# Patient Record
Sex: MALE | URBAN - METROPOLITAN AREA
[De-identification: names, ages, dates, MRNs, and addresses within clinical notes are randomized per-mention and may not be internally consistent; named-entity substitution may affect disease eponyms.]

---

## 2024-07-26 ENCOUNTER — TELEPHONE (OUTPATIENT)
Dept: NEUROSURGERY | Facility: CLINIC | Age: 74
End: 2024-07-26
Payer: OTHER GOVERNMENT

## 2024-07-26 NOTE — TELEPHONE ENCOUNTER
07/26/24 PT CALLED TO INFORM THAT HIS MRI LUMBAR WAS COMPLETED AT [facility] AND [facility]. [facility] SAID THAT [facility].  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 HE HAS BEEN RECEIVING MANY CALLS AND WILL NOT ANSWER ANYMORE CALLS TODAY.    PT PHONE NUMBER: [phone]  (HE SAID HE WONT ANSWER ANY MORE CALLS.  I REMINDED PT OF HIS UPCOMING APPOINTMENT WITH DR. HOUSE AT 08/15/24 AT 9:45 A.M.

## 2024-08-01 ENCOUNTER — TELEPHONE (OUTPATIENT)
Dept: NEUROSURGERY | Facility: CLINIC | Age: 74
End: 2024-08-01

## 2024-08-21 NOTE — PROGRESS NOTES
Patient ID: Jed Valladares is a 73 y.o. male is being seen for consultation today at the request of Jared Schaffer DO for low back pain.     Imaging: MRI of the lumbar spine performed on 07/06/2024    Subjective     The patient is here in regards to   Chief Complaint   Patient presents with    Back Pain       History of Present Illness  Mr. Valladares has had longstanding back pain ever since his tour in [place] where he was injured.  He describes pain in his low back as well as bilateral burning sensation in his legs.  He is unable to ambulate very well and uses a walker and a cane consistently.  He has not had an epidural steroid injection in his low back or SI joint injections.  [facility]  He denies any recent trauma or falls noticed progressive worsening of his balance over the past 1 year with progressive worsening of back pain as well.      While in the room and during my examination of the patient I wore a mask and eye protection.  I washed my hands before and after this patient encounter.  The patient was also wearing a mask.    The following portions of the patient's history were reviewed and updated as appropriate: allergies, current medications, past family history, past medical history, past social history, past surgical history and problem list.    Review of Systems   Constitutional:  Negative for fever.   HENT:  Positive for congestion and tinnitus.    Eyes:  Negative for visual disturbance (IMPLANT IN LEFT EYE).   Respiratory:  Positive for shortness of breath. Negative for chest tightness.    Cardiovascular:  Negative for chest pain.   Gastrointestinal:  Negative for nausea and vomiting.   Endocrine: Positive for heat intolerance. Negative for cold intolerance.   Genitourinary:  Positive for difficulty urinating.   Musculoskeletal:  Positive for back pain and gait problem.   Skin:  Negative for rash.   Allergic/Immunologic: Positive for environmental  allergies.   Neurological:  Positive for numbness and headaches. Negative for dizziness, weakness and light-headedness.   Hematological:  Bruises/bleeds easily.   Psychiatric/Behavioral:  Negative for confusion and decreased concentration.         History reviewed. No pertinent past medical history.    Allergies   Allergen Reactions    Aspirin Hives, Itching and Other (See Comments)     RAISES HIS BLOOD PRESSURE    Egg-Derived Products Hives, Itching and Irritability       History reviewed. No pertinent family history.    Social History     Socioeconomic History    Marital status: Unknown   Tobacco Use    Smoking status: Every Day     Current packs/day: 0.25     Average packs/day: 0.3 packs/day for 65.6 years (16.4 ttl pk-yrs)     Types: Cigarettes     Start date: 1959    Smokeless tobacco: Never   Vaping Use    Vaping status: Never Used       History reviewed. No pertinent surgical history.      Objective     Vitals:    08/22/24 1046   BP: 130/72   Pulse: 54   Resp: 16   SpO2: 96%     Body mass index is 31.58 kg/m².    Physical Exam  Constitutional:       Appearance: Normal appearance.   HENT:      Head: Normocephalic and atraumatic.   Eyes:      Extraocular Movements: Extraocular movements intact.      Conjunctiva/sclera: Conjunctivae normal.      Pupils: Pupils are equal, round, and reactive to light.   Cardiovascular:      Rate and Rhythm: Normal rate and regular rhythm.      Pulses: Normal pulses.   Pulmonary:      Breath sounds: Normal breath sounds.   Abdominal:      Palpations: Abdomen is soft.   Musculoskeletal:         General: Normal range of motion.      Cervical back: Normal range of motion and neck supple.      Comments: Bilateral SI joint inflammation characterized by:    -Pelvic Distraction test   -Lateral Iliac compression test   -FABERS (Kin's test)   -Ganslen's Test     Skin:     General: Skin is warm and dry.   Neurological:      Mental Status: He is alert and oriented to person, place, and  time.      Cranial Nerves: Cranial nerves 2-12 are intact.      Motor: Motor function is intact. No weakness or atrophy.      Coordination: Coordination is intact. Romberg sign negative. Romberg Test normal.      Gait: Gait is intact. Gait normal.      Deep Tendon Reflexes: Reflexes are normal and symmetric.      Reflex Scores:       Tricep reflexes are 2+ on the right side and 2+ on the left side.       Bicep reflexes are 2+ on the right side and 2+ on the left side.       Brachioradialis reflexes are 2+ on the right side and 2+ on the left side.       Patellar reflexes are 2+ on the right side and 2+ on the left side.       Achilles reflexes are 2+ on the right side and 2+ on the left side.  Psychiatric:         Speech: Speech normal.         Neurologic Exam     Mental Status   Oriented to person, place, and time.   Attention: normal. Concentration: normal.   Speech: speech is normal   Level of consciousness: alert    Cranial Nerves   Cranial nerves II through XII intact.     CN III, IV, VI   Pupils are equal, round, and reactive to light.    Motor Exam   Muscle bulk: normal  Overall muscle tone: normal    Strength   Strength 5/5 except as noted.     Sensory Exam   Light touch normal.     Gait, Coordination, and Reflexes     Gait  Gait: normal    Coordination   Romberg: negative    Reflexes   Reflexes 2+ except as noted.   Right brachioradialis: 2+  Left brachioradialis: 2+  Right biceps: 2+  Left biceps: 2+  Right triceps: 2+  Left triceps: 2+  Right patellar: 2+  Left patellar: 2+  Right achilles: 2+  Left achilles: 2+      Assessment & Plan   Independent Review of Radiographic Studies:      I personally reviewed the images from the following studies.    MR: MRI of the lumbar spine wo contrast was reviewed and shows large disc herniation at L1 to causing severe stenosis of the thecal sac at this level.  The disc fragment travels inferiorly behind the body of L2.  There is also a old compression deformity of L1  without significant retropulsion.  There are Schmorl's nodes at L3-4 and mild spondylolisthesis at L5-S1.  There is advanced posterior ligamentous hypertrophy at L4-5 resulting in severe stenosis at this level as well.    Assessment/Plan: He has 3 major issues contributing to his back and leg pain.  He has a very large acute appearing disc herniation at L1-2 causing severe stenosis.  He also has more chronic degenerative disease at L4-5 causing severe stenosis.  On physical exam he has florid symptoms of SI joint inflammation as well.  All 3 of these would need to be addressed in order to improve his mobility and pain level.  He is also a current smoker.  I counseled him on the importance of smoking cessation and nicotine cessation in regards to his spine and SI joint health.    Medical Decision Making:      Referral to pain management for SI joint injections and epidural steroid injection  Smoking cessation, nicotine cessation         Diagnoses and all orders for this visit:    1. Lumbar disc herniation with radiculopathy (Primary)  -     Ambulatory Referral to Pain Management  -     CT Lumbar Spine Without Contrast; Future    2. Spinal stenosis, lumbar region, with neurogenic claudication  -     Ambulatory Referral to Pain Management    3. SI (sacroiliac) joint inflammation  -     Ambulatory Referral to Pain Management             Patient Instructions/Recommendations:    Follow-up in 3 months      Sachin Argueta MD  08/22/24  11:36 EDT

## 2024-08-22 ENCOUNTER — OFFICE VISIT (OUTPATIENT)
Dept: NEUROSURGERY | Facility: CLINIC | Age: 74
End: 2024-08-22
Payer: OTHER GOVERNMENT

## 2024-08-22 VITALS
HEIGHT: 74 IN | OXYGEN SATURATION: 96 % | DIASTOLIC BLOOD PRESSURE: 72 MMHG | RESPIRATION RATE: 16 BRPM | SYSTOLIC BLOOD PRESSURE: 130 MMHG | WEIGHT: 246 LBS | HEART RATE: 54 BPM | BODY MASS INDEX: 31.57 KG/M2

## 2024-08-22 DIAGNOSIS — M51.16 LUMBAR DISC HERNIATION WITH RADICULOPATHY: Primary | ICD-10-CM

## 2024-08-22 DIAGNOSIS — M46.1 SI (SACROILIAC) JOINT INFLAMMATION: ICD-10-CM

## 2024-08-22 DIAGNOSIS — M48.062 SPINAL STENOSIS, LUMBAR REGION, WITH NEUROGENIC CLAUDICATION: ICD-10-CM

## 2024-08-22 PROCEDURE — 99203 OFFICE O/P NEW LOW 30 MIN: CPT | Performed by: NEUROLOGICAL SURGERY

## 2024-08-22 RX ORDER — UREA 10 %
500 LOTION (ML) TOPICAL DAILY
COMMUNITY

## 2024-08-22 RX ORDER — ALPRAZOLAM 1 MG/1
TABLET ORAL
COMMUNITY
Start: 2024-05-22

## 2024-08-22 RX ORDER — AMOXICILLIN 250 MG
1 CAPSULE ORAL DAILY
COMMUNITY

## 2024-08-22 RX ORDER — AMMONIUM LACTATE 12 G/100G
LOTION TOPICAL
COMMUNITY
Start: 2024-05-22

## 2024-08-22 RX ORDER — QUETIAPINE FUMARATE 100 MG/1
400 TABLET, FILM COATED ORAL NIGHTLY
COMMUNITY

## 2024-08-22 RX ORDER — CHOLECALCIFEROL (VITAMIN D3) 25 MCG
1000 TABLET ORAL DAILY
COMMUNITY

## 2024-08-22 RX ORDER — MULTIVIT WITH MINERALS/LUTEIN
500 TABLET ORAL DAILY
COMMUNITY

## 2024-08-22 RX ORDER — PREDNISOLONE ACETATE 10 MG/ML
1 SUSPENSION/ DROPS OPHTHALMIC 4 TIMES DAILY
COMMUNITY

## 2024-08-22 RX ORDER — IPRATROPIUM BROMIDE AND ALBUTEROL SULFATE 2.5; .5 MG/3ML; MG/3ML
3 SOLUTION RESPIRATORY (INHALATION) EVERY 4 HOURS PRN
COMMUNITY

## 2024-08-22 RX ORDER — CLOTRIMAZOLE 1 G/ML
SOLUTION TOPICAL
COMMUNITY
Start: 2024-05-25

## 2024-08-22 RX ORDER — ACETAMINOPHEN 325 MG/1
TABLET ORAL
COMMUNITY
Start: 2024-05-22

## 2024-08-22 RX ORDER — HYDROCHLOROTHIAZIDE 25 MG/1
25 TABLET ORAL DAILY
COMMUNITY

## 2024-08-22 RX ORDER — CARBOXYMETHYLCELLULOSE SODIUM 5 MG/ML
SOLUTION/ DROPS OPHTHALMIC 3 TIMES DAILY PRN
COMMUNITY

## 2024-08-22 RX ORDER — LISINOPRIL 20 MG/1
20 TABLET ORAL DAILY
COMMUNITY

## 2024-08-22 RX ORDER — APIXABAN 5 MG/1
TABLET, FILM COATED ORAL
COMMUNITY
Start: 2024-05-22

## 2024-08-22 RX ORDER — DULOXETIN HYDROCHLORIDE 20 MG/1
20 CAPSULE, DELAYED RELEASE ORAL DAILY
COMMUNITY

## 2024-08-22 RX ORDER — KETOROLAC TROMETHAMINE 4 MG/ML
SOLUTION/ DROPS OPHTHALMIC 4 TIMES DAILY
COMMUNITY

## 2024-08-22 RX ORDER — TERAZOSIN 5 MG/1
10 CAPSULE ORAL NIGHTLY
COMMUNITY

## 2024-08-22 RX ORDER — HYDROCODONE BITARTRATE AND ACETAMINOPHEN 7.5; 325 MG/1; MG/1
1 TABLET ORAL EVERY 6 HOURS PRN
COMMUNITY

## 2024-08-22 RX ORDER — TADALAFIL 20 MG/1
40 TABLET ORAL
COMMUNITY

## 2024-08-22 RX ORDER — FLUTICASONE PROPIONATE 50 MCG
SPRAY, SUSPENSION (ML) NASAL
COMMUNITY
Start: 2024-05-22

## 2024-08-22 RX ORDER — AMLODIPINE BESYLATE 10 MG/1
10 TABLET ORAL DAILY
COMMUNITY

## 2024-10-15 ENCOUNTER — TELEPHONE (OUTPATIENT)
Dept: NEUROSURGERY | Facility: CLINIC | Age: 74
End: 2024-10-15
Payer: OTHER GOVERNMENT

## 2024-10-15 NOTE — TELEPHONE ENCOUNTER
Caller: MARLI    Relationship: SELF    Best call back number: [phone]      What was the call regarding: PATIENT CALLED STATING HE IS ON [facility] TREATMENT AND HAS CT SCHEDULED ON 11/16/24 LUMBAR - WANTS TO KNOW IF THE DYE WILL CAUSE ANY REACTION W THE [facility].      PLEASE ADVISE  THANK YOU

## 2024-10-16 NOTE — TELEPHONE ENCOUNTER
Firstly I am not sure what [facility] stands for, secondly his CT scan is ordered without contrast I believe

## 2024-10-16 NOTE — TELEPHONE ENCOUNTER
I called to talk to Jed and tried to leave him a vm twice but got no response.  I was calling to telling him he does not need to worry about the CT because it does not have contrast dye.

## 2024-10-17 NOTE — TELEPHONE ENCOUNTER
Patient called in stating nobody tried to reach him but then stated if we come up as spam he will not answer calls. Read message explained CT is without contrast and if he had any other concerns about CT interfering with [facility] procedure he needs to reach out to the [facility] doctor. Patient voiced understanding.

## 2024-11-06 ENCOUNTER — HOSPITAL ENCOUNTER (OUTPATIENT)
Dept: CT IMAGING | Facility: HOSPITAL | Age: 74
Discharge: HOME | End: 2024-11-06
Admitting: NEUROLOGICAL SURGERY
Payer: OTHER GOVERNMENT

## 2024-11-06 DIAGNOSIS — M51.16 LUMBAR DISC HERNIATION WITH RADICULOPATHY: ICD-10-CM

## 2024-11-06 PROCEDURE — 72131 CT LUMBAR SPINE W/O DYE: CPT

## 2024-11-19 NOTE — PROGRESS NOTES
Patient ID: Jed Valladares is a 74 y.o. male is here today for follow-up for lumbar disc herniation and SI joint inflammation.    Imaging: CT of the lumbar spine completed on 11/06/2024    Subjective     The patient is here in regards to   Chief Complaint   Patient presents with    Back Pain    Leg Pain    Follow-up       History of Present Illness  Jed continues to have extensive back pain.  He has been able to successfully quit smoking.  He has pain that travels down his legs with symptoms of neurogenic claudication as well as bilateral SI joint inflammation pain.  Rather than use ice over his SI joints, he continues to use a heating pad as well as a large heated blanket.      While in the room and during my examination of the patient I wore a mask and eye protection.  I washed my hands before and after this patient encounter.  The patient was also wearing a mask.    The following portions of the patient's history were reviewed and updated as appropriate: allergies, current medications, past family history, past medical history, past social history, past surgical history and problem list.    Review of Systems   Constitutional:  Positive for fatigue.   Gastrointestinal:  Positive for nausea. Negative for vomiting.   Musculoskeletal:  Positive for back pain and gait problem.   Neurological:  Positive for dizziness, weakness and light-headedness. Negative for numbness and headaches.        History reviewed. No pertinent past medical history.    Allergies   Allergen Reactions    Aspirin Hives, Itching and Other (See Comments)     RAISES HIS BLOOD PRESSURE    Egg-Derived Products Hives, Itching and Irritability    Flexeril [Cyclobenzaprine] Unknown - Low Severity    Salsalate Unknown - Low Severity    Haemophilus Influenzae Vaccines Hives, Itching and Rash       History reviewed. No pertinent family history.    Social History     Socioeconomic History    Marital status: Single   Tobacco Use    Smoking status:  Every Day     Current packs/day: 0.25     Average packs/day: 0.3 packs/day for 65.9 years (16.5 ttl pk-yrs)     Types: Cigarettes     Start date: 1959    Smokeless tobacco: Never   Vaping Use    Vaping status: Never Used       History reviewed. No pertinent surgical history.      Objective     Vitals:    11/20/24 1040   BP: 128/74   Pulse: 73   Resp: 16   Temp: 97.5 °F (36.4 °C)   SpO2: 98%     Body mass index is 31.88 kg/m².    Physical Exam  Constitutional:       General: He is awake.      Appearance: Normal appearance.   HENT:      Head: Normocephalic and atraumatic.   Eyes:      General: Lids are normal.      Extraocular Movements: Extraocular movements intact.      Conjunctiva/sclera: Conjunctivae normal.      Pupils: Pupils are equal, round, and reactive to light.   Cardiovascular:      Rate and Rhythm: Normal rate and regular rhythm.      Pulses: Normal pulses.   Pulmonary:      Breath sounds: Normal breath sounds.   Abdominal:      Palpations: Abdomen is soft.   Musculoskeletal:         General: Normal range of motion.      Cervical back: Normal range of motion and neck supple.      Comments: Bilateral SI joint inflammation characterized by:    -Pelvic Distraction test   -Lateral Iliac compression test   -FABERS (Kin's test)   -Ganslen's Test     Skin:     General: Skin is warm and dry.   Neurological:      Mental Status: He is alert and oriented to person, place, and time.      Motor: Motor function is intact. No weakness or atrophy.      Coordination: Coordination is intact. Romberg sign negative.      Gait: Gait is intact. Gait normal.      Deep Tendon Reflexes: Reflexes are normal and symmetric.      Reflex Scores:       Tricep reflexes are 2+ on the right side and 2+ on the left side.       Bicep reflexes are 2+ on the right side and 2+ on the left side.       Brachioradialis reflexes are 2+ on the right side and 2+ on the left side.       Patellar reflexes are 2+ on the right side and 2+ on the  left side.       Achilles reflexes are 2+ on the right side and 2+ on the left side.        Neurological Exam  Mental Status  Awake and alert. Oriented to person, place and time. Oriented to person, place, and time.    Cranial Nerves  CN II: Visual acuity is normal. Visual fields full to confrontation.  CN III, IV, VI: Extraocular movements intact bilaterally. Normal lids and orbits bilaterally. Pupils equal round and reactive to light bilaterally.  CN V: Facial sensation is normal.  CN VII: Full and symmetric facial movement.  CN IX, X: Palate elevates symmetrically. Normal gag reflex.  CN XI: Shoulder shrug strength is normal.  CN XII: Tongue midline without atrophy or fasciculations.    Motor                                               Right                     Left  Deltoid                                   5                          5   Biceps                                   5                          5   Iliopsoas                               5                          5   Quadriceps                           5                          5   Hamstring                             5                          5   Gastrocnemius                     5                           5   Anterior tibialis                      5                          5    Sensory  Sensation is intact to light touch, pinprick, vibration and proprioception in all four extremities.    Reflexes  Deep tendon reflexes are 2+ and symmetric in all four extremities.                                            Right                      Left  Brachioradialis                    2+                         2+  Biceps                                 2+                         2+  Triceps                                2+                         2+  Patellar                                2+                         2+  Achilles                                2+                         2+    Coordination    Finger-to-nose, rapid alternating movements  and heel-to-shin normal bilaterally without dysmetria.    Gait   Normal gait. Normal gait. Romberg is absent.      Assessment & Plan   Independent Review of Radiographic Studies:      I personally reviewed the images from the following studies.      FINDINGS:    CT: CT of the lumbar spine was reviewed and shows no evidence of pars defect or calcification of the disc.  There does appear to be a slight interval decrease in the size of his disc herniation compared to previous MRI    Assessment/Plan: Jed has multiple issues.  He is being currently treated for bladder cancer with observation after chemotherapy.  He also has an opioid tolerance after being a combat  and receiving a significant amount of morphine and other opioids over the years.  He also has both SI joint inflammation as well as a large disc herniation causing neurogenic claudication and SI joint pain.  I recommended that we try an SI joint injection followed by ice and anti-inflammatory treatment.  His most recent CT scan shows no evidence of calcification of his disc herniation but the disc herniation may have decreased in size and I think a repeat MRI would be helpful and understanding what his L2-3 disc herniation currently looks like.  If it still present, I think we can improve his quality of life by removing the large disc herniation.  Will continue to try to treat his SI joint inflammation conservatively.    Medical Decision Making:      MRI lumbar spine without contrast  SI joint injections         Diagnoses and all orders for this visit:    1. Lumbar disc herniation with radiculopathy (Primary)  -     MRI Lumbar Spine Without Contrast; Future    2. Spinal stenosis, lumbar region, with neurogenic claudication  -     MRI Lumbar Spine Without Contrast; Future    3. SI (sacroiliac) joint inflammation  -     SI Joint Injection             Patient Instructions/Recommendations:    After your SI joint injection:  -Please schedule your second  SI joint injection at the time of your first SI joint injection for 2 weeks later  -Alternate 1 tablet of extra strength Tylenol and 2 tablets of Aleve (or any other NSAID) in a scheduled manner every 4 hours for at least 2 weeks after the injection    -Take an ice bath by submerging the buttocks area in 50 degree water for 30 minutes/day for 5 days consecutively after your injection.   -Try adding ice gradually to decrease the shock of the ice bath   -Try putting a robe in the dryer for 35 minutes so that the patient can have a warm robe to wear after the ice bath    -Continue to use ice packs as much as tolerable when you are not taking an ice bath        Sachin Argueta MD  11/20/24  11:14 EST

## 2024-11-20 ENCOUNTER — OFFICE VISIT (OUTPATIENT)
Dept: NEUROSURGERY | Facility: CLINIC | Age: 74
End: 2024-11-20
Payer: OTHER GOVERNMENT

## 2024-11-20 VITALS
HEART RATE: 73 BPM | DIASTOLIC BLOOD PRESSURE: 74 MMHG | BODY MASS INDEX: 31.87 KG/M2 | WEIGHT: 248.3 LBS | OXYGEN SATURATION: 98 % | HEIGHT: 74 IN | TEMPERATURE: 97.5 F | RESPIRATION RATE: 16 BRPM | SYSTOLIC BLOOD PRESSURE: 128 MMHG

## 2024-11-20 DIAGNOSIS — M46.1 SI (SACROILIAC) JOINT INFLAMMATION: ICD-10-CM

## 2024-11-20 DIAGNOSIS — M48.062 SPINAL STENOSIS, LUMBAR REGION, WITH NEUROGENIC CLAUDICATION: ICD-10-CM

## 2024-11-20 DIAGNOSIS — M51.16 LUMBAR DISC HERNIATION WITH RADICULOPATHY: Primary | ICD-10-CM

## 2025-01-14 ENCOUNTER — HOSPITAL ENCOUNTER (OUTPATIENT)
Dept: GENERAL RADIOLOGY | Facility: HOSPITAL | Age: 75
Discharge: HOME | End: 2025-01-14
Payer: OTHER GOVERNMENT

## 2025-01-14 ENCOUNTER — HOSPITAL ENCOUNTER (OUTPATIENT)
Dept: MRI IMAGING | Facility: HOSPITAL | Age: 75
Discharge: HOME | End: 2025-01-14
Payer: OTHER GOVERNMENT

## 2025-01-14 DIAGNOSIS — M48.062 SPINAL STENOSIS, LUMBAR REGION, WITH NEUROGENIC CLAUDICATION: ICD-10-CM

## 2025-01-14 DIAGNOSIS — M51.16 LUMBAR DISC HERNIATION WITH RADICULOPATHY: ICD-10-CM

## 2025-01-14 PROCEDURE — 72148 MRI LUMBAR SPINE W/O DYE: CPT

## 2025-01-21 NOTE — PROGRESS NOTES
Patient ID: Jed Valladares is a 74 y.o. male is here today for follow-up for lumbar disc herniation with radiculopathy.    Imaging: XR and MRI of the lumbar spine completed on 01/14/2025    Subjective     The patient is here in regards to   Chief Complaint   Patient presents with    Back Pain    Follow-up       History of Present Illness  Jed has quit smoking over the last 8 months and since then he has had resolution of his previous midline back pain.  We did obtain a repeat MRI to see if his disc resorbed and it looks like it has.  He still continues to have bilateral SI joint pain which he describes as a crushing low back pain in a bandlike distribution across his hips.      While in the room and during my examination of the patient I wore a mask and eye protection.  I washed my hands before and after this patient encounter.  The patient was also wearing a mask.    The following portions of the patient's history were reviewed and updated as appropriate: allergies, current medications, past family history, past medical history, past social history, past surgical history and problem list.    Review of Systems   Constitutional:  Negative for fever.   Musculoskeletal:  Positive for back pain and gait problem.   Neurological:  Positive for weakness and light-headedness. Negative for dizziness, numbness and headaches.        History reviewed. No pertinent past medical history.    Allergies   Allergen Reactions    Aspirin Hives, Itching and Other (See Comments)     RAISES HIS BLOOD PRESSURE    Egg-Derived Products Hives, Itching and Irritability    Flexeril [Cyclobenzaprine] Unknown - Low Severity    Salsalate Unknown - Low Severity    Haemophilus Influenzae Vaccines Hives, Itching and Rash       History reviewed. No pertinent family history.    Social History     Socioeconomic History    Marital status: Single   Tobacco Use    Smoking status: Former     Current packs/day: 0.00     Average packs/day: 0.3 packs/day  for 65.5 years (16.4 ttl pk-yrs)     Types: Cigarettes     Start date:      Quit date: 2024     Years since quittin.5    Smokeless tobacco: Never   Vaping Use    Vaping status: Never Used       History reviewed. No pertinent surgical history.      Objective     Vitals:    25 1001   BP: 118/76   Pulse: 54   Resp: 16   Temp: 97.1 °F (36.2 °C)   SpO2: 97%     Body mass index is 31.99 kg/m².    Physical Exam  Constitutional:       General: He is awake.      Appearance: Normal appearance.   HENT:      Head: Normocephalic and atraumatic.   Eyes:      General: Lids are normal.      Extraocular Movements: Extraocular movements intact.      Conjunctiva/sclera: Conjunctivae normal.      Pupils: Pupils are equal, round, and reactive to light.   Cardiovascular:      Rate and Rhythm: Normal rate and regular rhythm.      Pulses: Normal pulses.   Pulmonary:      Breath sounds: Normal breath sounds.   Abdominal:      Palpations: Abdomen is soft.   Musculoskeletal:         General: Normal range of motion.      Cervical back: Normal range of motion and neck supple.      Comments: Bilateral SI joint inflammation characterized by:    -Pelvic Distraction test   -Lateral Iliac compression test   -FABERS (Kin's test)   -Ganslen's Test     Skin:     General: Skin is warm and dry.   Neurological:      Mental Status: He is alert and oriented to person, place, and time.      Motor: Motor function is intact. No weakness or atrophy.      Coordination: Coordination is intact. Romberg sign negative.      Gait: Gait is intact. Gait normal.      Deep Tendon Reflexes: Reflexes are normal and symmetric.      Reflex Scores:       Tricep reflexes are 2+ on the right side and 2+ on the left side.       Bicep reflexes are 2+ on the right side and 2+ on the left side.       Brachioradialis reflexes are 2+ on the right side and 2+ on the left side.       Patellar reflexes are 2+ on the right side and 2+ on the left side.        Achilles reflexes are 2+ on the right side and 2+ on the left side.        Neurological Exam  Mental Status  Awake and alert. Oriented to person, place and time. Oriented to person, place, and time.    Cranial Nerves  CN II: Visual acuity is normal. Visual fields full to confrontation.  CN III, IV, VI: Extraocular movements intact bilaterally. Normal lids and orbits bilaterally. Pupils equal round and reactive to light bilaterally.  CN V: Facial sensation is normal.  CN VII: Full and symmetric facial movement.  CN IX, X: Palate elevates symmetrically. Normal gag reflex.  CN XI: Shoulder shrug strength is normal.  CN XII: Tongue midline without atrophy or fasciculations.    Motor                                               Right                     Left  Deltoid                                   5                          5   Biceps                                   5                          5   Iliopsoas                               5                          5   Quadriceps                           5                          5   Hamstring                             5                          5   Gastrocnemius                     5                           5   Anterior tibialis                      5                          5    Sensory  Sensation is intact to light touch, pinprick, vibration and proprioception in all four extremities.    Reflexes  Deep tendon reflexes are 2+ and symmetric in all four extremities.                                            Right                      Left  Brachioradialis                    2+                         2+  Biceps                                 2+                         2+  Triceps                                2+                         2+  Patellar                                2+                         2+  Achilles                                2+                         2+    Coordination    Finger-to-nose, rapid alternating movements and heel-to-shin  normal bilaterally without dysmetria.    Gait   Normal gait. Normal gait. Romberg is absent.      Assessment & Plan   Independent Review of Radiographic Studies:      I personally reviewed the images from the following studies.    FINDINGS:    MR: MRI of the lumbar spine wo contrast was reviewed and shows spinal stenosis at L4-5 secondary to posterior ligamentous hypertrophy, otherwise relatively normal-appearing lumbar spine for his age without any significant disc herniation.  Previous L1-2 disc herniation has completely reabsorbed    Assessment/Plan: Has signs and symptoms of SI joint inflammation bilaterally.  I think he benefit from SI joint injections.  He has had extensive physical therapy and has tried epidural steroid injections in the past as well.    Medical Decision Making:      SI joint injections, follow-up in 3 months         Diagnoses and all orders for this visit:    1. SI (sacroiliac) joint inflammation (Primary)  -     SI Joint Injection    2. Spinal stenosis, lumbar region, with neurogenic claudication             Patient Instructions/Recommendations:    Call with any questions or concerns      Sachin Argueta MD  01/22/25  10:33 EST

## 2025-01-22 ENCOUNTER — OFFICE VISIT (OUTPATIENT)
Dept: NEUROSURGERY | Facility: CLINIC | Age: 75
End: 2025-01-22
Payer: OTHER GOVERNMENT

## 2025-01-22 VITALS
TEMPERATURE: 97.1 F | BODY MASS INDEX: 31.97 KG/M2 | OXYGEN SATURATION: 97 % | RESPIRATION RATE: 16 BRPM | WEIGHT: 249.12 LBS | HEIGHT: 74 IN | HEART RATE: 54 BPM | SYSTOLIC BLOOD PRESSURE: 118 MMHG | DIASTOLIC BLOOD PRESSURE: 76 MMHG

## 2025-01-22 DIAGNOSIS — M48.062 SPINAL STENOSIS, LUMBAR REGION, WITH NEUROGENIC CLAUDICATION: ICD-10-CM

## 2025-01-22 DIAGNOSIS — M46.1 SI (SACROILIAC) JOINT INFLAMMATION: Primary | ICD-10-CM

## 2025-01-23 ENCOUNTER — TELEPHONE (OUTPATIENT)
Dept: NEUROSURGERY | Facility: CLINIC | Age: 75
End: 2025-01-23
Payer: OTHER GOVERNMENT

## 2025-01-23 NOTE — TELEPHONE ENCOUNTER
Received fax from the [facility] stating that patient needs to go to [facility]. Faxed SI Injection Referral and New Packet Referral to [facility] to get approval for patient.

## 2025-04-21 NOTE — PROGRESS NOTES
Patient ID: Jed Valladares is a 74 y.o. male is here today for follow-up after getting [facility].    Subjective     The patient is here in regards to   Chief Complaint   Patient presents with    Back Pain    Follow-up       History of Present Illness  Jed has not had his SI joint injections yet due to a logistical issue.      While in the room and during my examination of the patient I wore a mask and eye protection.  I washed my hands before and after this patient encounter.  The patient was also wearing a mask.    The following portions of the patient's history were reviewed and updated as appropriate: allergies, current medications, past family history, past medical history, past social history, past surgical history and problem list.    Review of Systems   Constitutional:  Negative for fever.   Musculoskeletal:  Positive for back pain and gait problem.   Neurological:  Positive for weakness. Negative for dizziness, light-headedness, numbness and headaches.        History reviewed. No pertinent past medical history.    Allergies   Allergen Reactions    Aspirin Hives, Itching and Other (See Comments)     RAISES HIS BLOOD PRESSURE    Egg-Derived Products Hives, Itching and Irritability    Flexeril [Cyclobenzaprine] Unknown - Low Severity    Salsalate Unknown - Low Severity    Haemophilus Influenzae Vaccines Hives, Itching and Rash       History reviewed. No pertinent family history.    Social History     Socioeconomic History    Marital status: Single   Tobacco Use    Smoking status: Former     Current packs/day: 0.00     Average packs/day: 0.3 packs/day for 65.5 years (16.4 ttl pk-yrs)     Types: Cigarettes     Start date:      Quit date: 2024     Years since quittin.8    Smokeless tobacco: Never   Vaping Use    Vaping status: Never Used       History reviewed. No pertinent surgical history.      Objective     Vitals:    25 1115   BP: 124/62   Pulse: 69    Resp: 16   Temp: 97 °F (36.1 °C)   SpO2: 91%     Body mass index is 31.84 kg/m².    Physical Exam  Constitutional:       General: He is awake.   HENT:      Head: Normocephalic and atraumatic.   Eyes:      General: Lids are normal.      Extraocular Movements: Extraocular movements intact.      Conjunctiva/sclera: Conjunctivae normal.      Pupils: Pupils are equal, round, and reactive to light.   Pulmonary:      Breath sounds: Normal breath sounds.   Abdominal:      Palpations: Abdomen is soft.   Musculoskeletal:         General: Normal range of motion.      Comments: Bilateral SI joint inflammation characterized by:    -Pelvic Distraction test   -Lateral Iliac compression test   -FABERS (Kin's test)   -Ganslen's Test     Skin:     General: Skin is warm and dry.   Neurological:      Mental Status: He is alert.      Coordination: Coordination is intact.      Deep Tendon Reflexes: Reflexes are normal and symmetric.   Psychiatric:         Behavior: Behavior is cooperative.         Neurological Exam  Mental Status  Awake and alert. Oriented to person, place and time.    Cranial Nerves  CN II: Visual acuity is normal. Visual fields full to confrontation.  CN III, IV, VI: Extraocular movements intact bilaterally. Normal lids and orbits bilaterally. Pupils equal round and reactive to light bilaterally.  CN V: Facial sensation is normal.  CN VII: Full and symmetric facial movement.  CN IX, X: Palate elevates symmetrically. Normal gag reflex.  CN XI: Shoulder shrug strength is normal.  CN XII: Tongue midline without atrophy or fasciculations.    Motor                                               Right                     Left  Deltoid                                   5                          5   Biceps                                   5                          5   Triceps                                  5                          5   Iliopsoas                               5                          5   Quadriceps                            5                          5   Hamstring                             5                          5   Gastrocnemius                     5                           5   Anterior tibialis                      5                          5    Sensory  Sensation is intact to light touch, pinprick, vibration and proprioception in all four extremities.    Reflexes  Deep tendon reflexes are 2+ and symmetric in all four extremities.    Coordination    Finger-to-nose, rapid alternating movements and heel-to-shin normal bilaterally without dysmetria.    Gait  Normal casual, toe, heel and tandem gait.      Assessment & Plan   Independent Review of Radiographic Studies:      I personally reviewed the images from the following studies.    FINDINGS:    No new neuroimaging.    Assessment/Plan: We will reorder the SI joint injections.  He notes that his pain has been getting worse over time and become more debilitating.    Medical Decision Making:      SI joint injections, follow-up in 3 months         Diagnoses and all orders for this visit:    1. SI (sacroiliac) joint inflammation (Primary)  -     SI Joint Injection             Patient Instructions/Recommendations:    After your SI joint injection:  -Please schedule your second SI joint injection at the time of your first SI joint injection for 2 weeks later  -Alternate 1 tablet of extra strength Tylenol and 2 tablets of Aleve (or any other NSAID) in a scheduled manner every 4 hours for at least 2 weeks after the injection    -Take an ice bath by submerging the buttocks area in 50 degree water for 30 minutes/day for 5 days consecutively after your injection.   -Try adding ice gradually to decrease the shock of the ice bath   -Try putting a robe in the dryer for 35 minutes so that the patient can have a warm robe to wear after the ice bath    -Continue to use ice packs as much as tolerable when you are not taking an ice bath        Sachin Argueta,  MD  04/23/25  11:33 EDT

## 2025-04-23 ENCOUNTER — OFFICE VISIT (OUTPATIENT)
Dept: NEUROSURGERY | Facility: CLINIC | Age: 75
End: 2025-04-23
Payer: OTHER GOVERNMENT

## 2025-04-23 VITALS
WEIGHT: 248 LBS | RESPIRATION RATE: 16 BRPM | HEIGHT: 74 IN | DIASTOLIC BLOOD PRESSURE: 62 MMHG | HEART RATE: 69 BPM | SYSTOLIC BLOOD PRESSURE: 124 MMHG | OXYGEN SATURATION: 91 % | BODY MASS INDEX: 31.83 KG/M2 | TEMPERATURE: 97 F

## 2025-04-23 DIAGNOSIS — M46.1 SI (SACROILIAC) JOINT INFLAMMATION: Primary | ICD-10-CM

## 2025-05-14 ENCOUNTER — TELEPHONE (OUTPATIENT)
Dept: NEUROSURGERY | Facility: CLINIC | Age: 75
End: 2025-05-14
Payer: OTHER GOVERNMENT

## 2025-05-14 NOTE — TELEPHONE ENCOUNTER
Patient called in stating he may not do the Si injections. Stated he had friends that were unable to feel their legs for days to weeks. I told him it was up to him mainly if he wanted it to be done. Then he stated he would go in there to talk about it. He said he might as well move into hospital since he is there so much. At this Im not sure what he is going to do.

## 2025-06-23 ENCOUNTER — TELEPHONE (OUTPATIENT)
Dept: NEUROSURGERY | Facility: CLINIC | Age: 75
End: 2025-06-23

## 2025-07-23 ENCOUNTER — OFFICE VISIT (OUTPATIENT)
Dept: NEUROSURGERY | Facility: CLINIC | Age: 75
End: 2025-07-23
Payer: OTHER GOVERNMENT

## 2025-07-23 ENCOUNTER — TELEPHONE (OUTPATIENT)
Dept: NEUROSURGERY | Facility: CLINIC | Age: 75
End: 2025-07-23

## 2025-07-23 VITALS
DIASTOLIC BLOOD PRESSURE: 72 MMHG | WEIGHT: 254.5 LBS | OXYGEN SATURATION: 98 % | BODY MASS INDEX: 32.68 KG/M2 | HEART RATE: 61 BPM | TEMPERATURE: 98.2 F | SYSTOLIC BLOOD PRESSURE: 134 MMHG

## 2025-07-23 DIAGNOSIS — M46.1 SI (SACROILIAC) JOINT INFLAMMATION: Primary | ICD-10-CM

## 2025-07-23 NOTE — TELEPHONE ENCOUNTER
07/23/25 next follow up appt with Dr Argueta: 10/23/25 at 11:00 a.m.  Referral for pain management routed to Dr Alarcon with  Pain Management  Patient was provided an appt reminder and copy of pain management   Referral and contact information for Dr Adler.    Request for service faxed to [facility]. Phone number: [phone] ext: [phone].   FAX NUMBER: [phone]  DEPT: [facility].  [place] [facility] [place].  Request for service for and all supporting information scanned to patient's media